# Patient Record
Sex: MALE | Race: WHITE | Employment: OTHER | URBAN - METROPOLITAN AREA
[De-identification: names, ages, dates, MRNs, and addresses within clinical notes are randomized per-mention and may not be internally consistent; named-entity substitution may affect disease eponyms.]

---

## 2023-01-31 ENCOUNTER — HOSPITAL ENCOUNTER (EMERGENCY)
Facility: CLINIC | Age: 88
Discharge: HOME | End: 2023-01-31
Attending: EMERGENCY MEDICINE
Payer: MEDICARE

## 2023-01-31 VITALS
WEIGHT: 187 LBS | SYSTOLIC BLOOD PRESSURE: 154 MMHG | RESPIRATION RATE: 18 BRPM | HEART RATE: 78 BPM | HEIGHT: 67 IN | OXYGEN SATURATION: 93 % | DIASTOLIC BLOOD PRESSURE: 81 MMHG | TEMPERATURE: 97.5 F | BODY MASS INDEX: 29.35 KG/M2

## 2023-01-31 DIAGNOSIS — I10 HYPERTENSION, UNSPECIFIED TYPE: Primary | ICD-10-CM

## 2023-01-31 LAB
ABSOLUTE EOS #: 0.3 K/UL (ref 0–0.4)
ABSOLUTE LYMPH #: 2.2 K/UL (ref 1–4.8)
ABSOLUTE MONO #: 0.5 K/UL (ref 0.1–1.2)
ANION GAP SERPL CALCULATED.3IONS-SCNC: 12 MMOL/L (ref 9–17)
BACTERIA: NORMAL
BASOPHILS # BLD: 1 % (ref 0–2)
BASOPHILS ABSOLUTE: 0.1 K/UL (ref 0–0.2)
BILIRUBIN URINE: NEGATIVE
BUN BLDV-MCNC: 17 MG/DL (ref 8–23)
CALCIUM SERPL-MCNC: 9.6 MG/DL (ref 8.6–10.4)
CHLORIDE BLD-SCNC: 102 MMOL/L (ref 98–107)
CO2: 24 MMOL/L (ref 20–31)
COLOR: YELLOW
CREAT SERPL-MCNC: 0.7 MG/DL (ref 0.7–1.2)
EOSINOPHILS RELATIVE PERCENT: 3 % (ref 1–4)
EPITHELIAL CELLS UA: NORMAL /HPF (ref 0–5)
GFR SERPL CREATININE-BSD FRML MDRD: >60 ML/MIN/1.73M2
GLUCOSE BLD-MCNC: 117 MG/DL (ref 70–99)
GLUCOSE URINE: NEGATIVE
HCT VFR BLD CALC: 44.9 % (ref 41–53)
HEMOGLOBIN: 15.2 G/DL (ref 13.5–17.5)
KETONES, URINE: NEGATIVE
LEUKOCYTE ESTERASE, URINE: NEGATIVE
LYMPHOCYTES # BLD: 26 % (ref 24–44)
MCH RBC QN AUTO: 29.1 PG (ref 26–34)
MCHC RBC AUTO-ENTMCNC: 33.9 G/DL (ref 31–37)
MCV RBC AUTO: 85.8 FL (ref 80–100)
MONOCYTES # BLD: 6 % (ref 2–11)
NITRITE, URINE: NEGATIVE
PDW BLD-RTO: 14.6 % (ref 12.5–15.4)
PH UA: 6.5 (ref 5–8)
PLATELET # BLD: 178 K/UL (ref 140–450)
PMV BLD AUTO: 7.4 FL (ref 6–12)
POTASSIUM SERPL-SCNC: 3.8 MMOL/L (ref 3.7–5.3)
PROTEIN UA: NEGATIVE
RBC # BLD: 5.24 M/UL (ref 4.5–5.9)
RBC UA: NORMAL /HPF (ref 0–2)
SEG NEUTROPHILS: 64 % (ref 36–66)
SEGMENTED NEUTROPHILS ABSOLUTE COUNT: 5.6 K/UL (ref 1.8–7.7)
SODIUM BLD-SCNC: 138 MMOL/L (ref 135–144)
SPECIFIC GRAVITY UA: 1 (ref 1–1.03)
TROPONIN, HIGH SENSITIVITY: 12 NG/L (ref 0–22)
TURBIDITY: CLEAR
URINE HGB: NEGATIVE
UROBILINOGEN, URINE: NORMAL
WBC # BLD: 8.6 K/UL (ref 3.5–11)
WBC UA: NORMAL /HPF (ref 0–5)

## 2023-01-31 PROCEDURE — 93005 ELECTROCARDIOGRAM TRACING: CPT | Performed by: REGISTERED NURSE

## 2023-01-31 PROCEDURE — 81001 URINALYSIS AUTO W/SCOPE: CPT

## 2023-01-31 PROCEDURE — 6370000000 HC RX 637 (ALT 250 FOR IP): Performed by: REGISTERED NURSE

## 2023-01-31 PROCEDURE — 85025 COMPLETE CBC W/AUTO DIFF WBC: CPT

## 2023-01-31 PROCEDURE — 80048 BASIC METABOLIC PNL TOTAL CA: CPT

## 2023-01-31 PROCEDURE — 99284 EMERGENCY DEPT VISIT MOD MDM: CPT

## 2023-01-31 PROCEDURE — 36415 COLL VENOUS BLD VENIPUNCTURE: CPT

## 2023-01-31 PROCEDURE — 84484 ASSAY OF TROPONIN QUANT: CPT

## 2023-01-31 RX ORDER — ATORVASTATIN CALCIUM 40 MG/1
40 TABLET, FILM COATED ORAL DAILY
COMMUNITY

## 2023-01-31 RX ORDER — LEVOTHYROXINE SODIUM 0.12 MG/1
125 TABLET ORAL DAILY
COMMUNITY

## 2023-01-31 RX ORDER — LISINOPRIL 10 MG/1
20 TABLET ORAL DAILY
COMMUNITY
Start: 2010-03-22

## 2023-01-31 RX ORDER — CLONIDINE HYDROCHLORIDE 0.1 MG/1
0.1 TABLET ORAL ONCE
Status: COMPLETED | OUTPATIENT
Start: 2023-01-31 | End: 2023-01-31

## 2023-01-31 RX ORDER — TRAZODONE HYDROCHLORIDE 50 MG/1
100 TABLET ORAL NIGHTLY
COMMUNITY

## 2023-01-31 RX ORDER — GLIMEPIRIDE 2 MG/1
2 TABLET ORAL
COMMUNITY

## 2023-01-31 RX ADMIN — CLONIDINE HYDROCHLORIDE 0.1 MG: 0.1 TABLET ORAL at 11:39

## 2023-01-31 ASSESSMENT — LIFESTYLE VARIABLES
HOW OFTEN DO YOU HAVE A DRINK CONTAINING ALCOHOL: MONTHLY OR LESS
HOW MANY STANDARD DRINKS CONTAINING ALCOHOL DO YOU HAVE ON A TYPICAL DAY: 1 OR 2

## 2023-01-31 ASSESSMENT — ENCOUNTER SYMPTOMS
SHORTNESS OF BREATH: 0
PHOTOPHOBIA: 0
NAUSEA: 0
VOMITING: 0
ABDOMINAL PAIN: 0
COUGH: 0
DIARRHEA: 0
BACK PAIN: 0

## 2023-01-31 ASSESSMENT — PAIN DESCRIPTION - ORIENTATION: ORIENTATION: LEFT

## 2023-01-31 ASSESSMENT — PAIN - FUNCTIONAL ASSESSMENT: PAIN_FUNCTIONAL_ASSESSMENT: NONE - DENIES PAIN

## 2023-01-31 ASSESSMENT — PAIN DESCRIPTION - LOCATION: LOCATION: BUTTOCKS

## 2023-01-31 ASSESSMENT — PAIN SCALES - GENERAL: PAINLEVEL_OUTOF10: 0

## 2023-01-31 ASSESSMENT — PAIN DESCRIPTION - PAIN TYPE: TYPE: CHRONIC PAIN

## 2023-01-31 NOTE — DISCHARGE INSTRUCTIONS
PLEASE RETURN TO THE EMERGENCY DEPARTMENT IMMEDIATELY if your symptoms worsen in anyway or in 8-12 hours if not improved for re-evaluation. You should immediately return to the ER for symptoms such as increasing pain, shortness of breath, fever, a feeling of passing out, light headed, dizziness, abdominal pain, numbness or weakness to the arms or legs, coolness or color change of the arms or legs. Take your medication as indicated and prescribed. If you are given an antibiotic then, make sure you get the prescription filled and take the antibiotics until finished. Please understand that at this time there is no evidence for a more serious underlying process, but that early in the process of an illness or injury, an emergency department workup can be falsely reassuring. You should contact your family doctor within the next 24 hours for a follow up appointment    Joaquin Ballard!!!    From [facility] [place] ([facility]) and [facility] Emergency Services    On behalf of the Emergency Department staff at [facility]), I would like to thank you for giving us the opportunity to address your health care needs and concerns. We hope that during your visit, our service was delivered in a professional and caring manner. Please keep [facility] [place] ([facility]) in mind as we walk with you down the path to your own personal wellness. Please expect an automated text message or email from us so we can ask a few questions about your health and progress. Based on your answers, a clinician may call you back to offer help and instructions. Please understand that early in the process of an illness or injury, an emergency department workup can be falsely reassuring. If you notice any worsening, changing or persistent symptoms please call your family doctor or return to the ER immediately. Tell us how we did during your visit at [email or web address]   and let us know about your experience

## 2023-01-31 NOTE — ED PROVIDER NOTES
[facility]  [facility]  Phone: [phone]        Pt Name: Robert Salmon  MRN: [id number]  [place] 6/1/1934  Date of evaluation: 1/31/23    CHIEFCOMPLAINT       Chief Complaint   Patient presents with    Hypertension       HISTORY OF PRESENT ILLNESS (Location/Symptom, Timing/Onset, Context/Setting, Quality, Duration, Modifying Factors, Severity)      Robert Salmon is a 80 y.o. male  who presents to the ED via private auto with elevated blood pressure. Patient states that he went for an MRI this morning and was informed by staff that his blood pressure is elevated was not told the number. He states that nurse follow-up on called him at home to check up on his care and asked patient to take his blood pressure where he states that it was 170s over 110s, he states that the nurse hotline recommended he come to the ER to be evaluated. Patient denies any headache, vision changes, chest pain, shortness of breath, difficulty breathing, abdominal pain nausea vomiting or diarrhea. Patient does report a short few episodes of feeling \"lightheaded\" he states he attributes this to getting less sleep than normal last night. Patient she does take lisinopril for his blood pressure and states there is been no changes recently to his occasions. On arrival he is resting on the cot comfortably with even nonlabored breaths is nontoxic-appearing with no acute distress noted. PAST MEDICAL / SURGICAL / SOCIAL / FAMILY HISTORY     PMH:  has a past medical history of Bladder cancer ([facility] [address] Diabetes mellitus ([facility] [address] Hyperlipidemia, Hypertension, Kidney stone, and Thyroid disease. Surgical History:  has a past surgical history that includes Tonsillectomy. Social History:  reports that he has quit smoking. His smoking use included cigarettes. He has never used smokeless tobacco. He reports current alcohol use. Family History: has no family status information on file.     family history is not on file.  Psychiatric History: None    Allergies: Penicillin g    Home Medications:   Prior to Admission medications    Medication Sig Start Date End Date Taking? Authorizing Provider   lisinopril (PRINIVIL;ZESTRIL) 10 MG tablet Take 20 mg by mouth daily 3/22/10  Yes Historical Provider, MD   levothyroxine (SYNTHROID) 125 MCG tablet Take 125 mcg by mouth Daily   Yes Historical Provider, MD   atorvastatin (LIPITOR) 40 MG tablet Take 40 mg by mouth daily   Yes Historical Provider, MD   glimepiride (AMARYL) 2 MG tablet Take 2 mg by mouth every morning (before breakfast)   Yes Historical Provider, MD   traZODone (DESYREL) 50 MG tablet Take 100 mg by mouth nightly   Yes Historical Provider, MD       REVIEW OF SYSTEMS  (2-9 systems for level 4, 10 ormore for level 5)      Review of Systems   Constitutional:  Negative for chills and fever. Eyes:  Negative for photophobia and visual disturbance. Respiratory:  Negative for cough and shortness of breath. Cardiovascular:  Negative for chest pain and palpitations. Gastrointestinal:  Negative for abdominal pain, diarrhea, nausea and vomiting. Genitourinary:  Negative for dysuria and hematuria. Musculoskeletal:  Negative for back pain, neck pain and neck stiffness. Neurological:  Positive for light-headedness. Negative for dizziness, syncope, facial asymmetry, speech difficulty, weakness, numbness and headaches. All other systems negative except as marked. PHYSICAL EXAM  (up to 7 for level 4, 8 or more for level 5)      INITIAL VITALS:  height is 5' 7\" (1.702 m) and weight is 84.8 kg (187 lb). His oral temperature is 97.5 °F (36.4 °C). His blood pressure is 154/81 (abnormal) and his pulse is 78. His respiration is 18 and oxygen saturation is 93%. Vital signs reviewed. Physical Exam  Constitutional:       General: He is not in acute distress. Appearance: Normal appearance. He is not ill-appearing or toxic-appearing.    HENT:      Head: Normocephalic and atraumatic.   Eyes:      General: No scleral icterus.        Right eye: No discharge.         Left eye: No discharge.      Extraocular Movements: Extraocular movements intact.      Conjunctiva/sclera: Conjunctivae normal.      Pupils: Pupils are equal, round, and reactive to light.   Neck:      Trachea: No tracheal deviation.   Cardiovascular:      Rate and Rhythm: Normal rate and regular rhythm.      Pulses:           Radial pulses are 2+ on the right side and 2+ on the left side.      Heart sounds: Normal heart sounds.   Pulmonary:      Effort: Pulmonary effort is normal.      Breath sounds: Normal breath sounds.   Abdominal:      General: Abdomen is flat. There is no distension.      Palpations: Abdomen is soft. There is no mass.      Tenderness: There is no abdominal tenderness. There is no guarding or rebound.      Hernia: No hernia is present.   Musculoskeletal:      Cervical back: Neck supple.      Right lower leg: No edema.      Left lower leg: No edema.   Skin:     General: Skin is warm and dry.      Capillary Refill: Capillary refill takes less than 2 seconds.   Neurological:      General: No focal deficit present.      Mental Status: He is alert and oriented to person, place, and time.      Cranial Nerves: No cranial nerve deficit.      Sensory: No sensory deficit.      Motor: No weakness.   Psychiatric:         Mood and Affect: Mood normal.         Behavior: Behavior normal.         DIFFERENTIAL DIAGNOSIS / MDM     Differential diagnosis considered: ACS, hypertensive urgency/crisis, CVA, MARY, CKD, anxiety    Chronic Conditions affecting care (DM,HTN,CA, etc): Hypertension, diabetes    Social Determinants of Health affecting care (unable to care for self, lives alone, unemployed, homeless,etc): None    History source(s) (patient,spouse,parent,family,friend,EMS,etc): Patient    Review of external sources (ECF,Hospital records,EMS report, radiology reports, etc): Past medical  records    Tests considered but not ordered: Not applicable    Independent interpretation of tests (eg.  X-ray, CAT scan, Doppler studies, EKG): EKG    Discussion of x-ray results with radiology: No applicable    Consults: See below    Consideration for admission/observation (even if discharged): Patient was considered for admission pending work-up. Prescription considerations: Clonidine    Sepsis considered: Yes    Critical Care note written: See below    After my physical exam I do have concern for possible ACS, hypertensive urgency/crisis, CVA, MARY, CKD and anxiety. Obtain baseline labs, EKG and troponin. CBC and BMP unremarkable. Troponin is negative. UA is unremarkable. Patient's blood pressure is improved with clonidine. Patient states he still is asymptomatic. Patient states he does have a follow-up with his PCP on Friday. I plan discharge patient home to follow-up with PCP on Friday as discussed. Instructed patient return with any headache, dizziness, vision changes, numbness/tingling, chest pain, shortness of breath, difficulty breathing, dumping, nausea vomiting or diarrhea. Patient was agreement to this plan this time. All question concerns were answered at this time. At this time the patient is without objective evidence of an acute process requiring hospitalization or inpatient management. They have remained hemodynamically stable throughout their entire ED visit and are stable for discharge with outpatient follow-up. The patient understands that at this time there is no evidence for a more malignant underlying process, but the patient also understands that early in the process of an illness or injury, an emergency department workup can be falsely reassuring.   Routine discharge counseling was given, and the patient understands that worsening, changing or persistent symptoms should prompt an immediate call or follow up with their primary physician or return to the emergency department. The importance of appropriate follow up was also discussed. I have reviewed the disposition diagnosis with the patient and or their family/guardian. I have answered their questions and given discharge instructions. They voiced understanding of these instructions and did not have any further questions or complaints. PLAN (LABS / IMAGING / EKG):  Orders Placed This Encounter   Procedures    Basic Metabolic Panel    CBC with Auto Differential    Troponin    Urinalysis with Microscopic    EKG 12 Lead       MEDICATIONS ORDERED:  Orders Placed This Encounter   Medications    cloNIDine (CATAPRES) tablet 0.1 mg       Controlled Substances Monitoring:     DIAGNOSTIC RESULTS     EKG: All EKG's are interpreted by the Emergency Department Physician who either signs or Co-signs this chart in the absenceof a cardiologist.        RADIOLOGY: All images are read by the radiologist and their interpretations are reviewed. No orders to display       No results found.     LABS:  Results for orders placed or performed during the hospital encounter of 05/97/73   Basic Metabolic Panel   Result Value Ref Range    Glucose 117 (H) 70 - 99 mg/dL    BUN 17 8 - 23 mg/dL    Creatinine 0.70 0.70 - 1.20 mg/dL    Est, Glom Filt Rate >60 >60 mL/min/1.73m2    Calcium 9.6 8.6 - 10.4 mg/dL    Sodium 138 135 - 144 mmol/L    Potassium 3.8 3.7 - 5.3 mmol/L    Chloride 102 98 - 107 mmol/L    CO2 24 20 - 31 mmol/L    Anion Gap 12 9 - 17 mmol/L   CBC with Auto Differential   Result Value Ref Range    WBC 8.6 3.5 - 11.0 k/uL    RBC 5.24 4.5 - 5.9 m/uL    Hemoglobin 15.2 13.5 - 17.5 g/dL    Hematocrit 44.9 41 - 53 %    MCV 85.8 80 - 100 fL    MCH 29.1 26 - 34 pg    MCHC 33.9 31 - 37 g/dL    RDW 14.6 12.5 - 15.4 %    Platelets 987 197 - 370 k/uL    MPV 7.4 6.0 - 12.0 fL    Seg Neutrophils 64 36 - 66 %    Lymphocytes 26 24 - 44 %    Monocytes 6 2 - 11 %    Eosinophils % 3 1 - 4 %    Basophils 1 0 - 2 %    Segs Absolute 5.60 1.8 - 7.7 k/uL Absolute Lymph # 2.20 1.0 - 4.8 k/uL    Absolute Mono # 0.50 0.1 - 1.2 k/uL    Absolute Eos # 0.30 0.0 - 0.4 k/uL    Basophils Absolute 0.10 0.0 - 0.2 k/uL   Troponin   Result Value Ref Range    Troponin, High Sensitivity 12 0 - 22 ng/L   Urinalysis with Microscopic   Result Value Ref Range    Color, UA Yellow Yellow    Turbidity UA Clear Clear    Glucose, Ur NEGATIVE NEGATIVE    Bilirubin Urine NEGATIVE NEGATIVE    Ketones, Urine NEGATIVE NEGATIVE    Specific [place], UA 1.005 1.005 - 1.030    Urine Hgb NEGATIVE NEGATIVE    pH, UA 6.5 5.0 - 8.0    Protein, UA NEGATIVE NEGATIVE    Urobilinogen, Urine Normal Normal    Nitrite, Urine NEGATIVE NEGATIVE    Leukocyte Esterase, Urine NEGATIVE NEGATIVE    WBC, UA None 0 - 5 /HPF    RBC, UA None 0 - 2 /HPF    Epithelial Cells UA 2 TO 5 0 - 5 /HPF    Bacteria, UA None None       EMERGENCY DEPARTMENT COURSE           Vitals:    Vitals:    01/31/23 1055 01/31/23 1139 01/31/23 1211   BP: (!) 210/98 (!) 192/89 (!) 154/81   Pulse: 88  78   Resp: 18     Temp: 97.5 °F (36.4 °C)     TempSrc: Oral     SpO2: 96% 96% 93%   Weight: 84.8 kg (187 lb)     Height: 5' 7\" (1.702 m)       -------------------------  BP: (!) 154/81, Temp: 97.5 °F (36.4 °C), Heart Rate: 78, Resp: 18      RE-EVALUATION:  See ED Course notes above. CONSULTS:  None    PROCEDURES:  None    FINAL IMPRESSION      1. Hypertension, unspecified type          DISPOSITION / PLAN     CONDITION ON DISPOSITION:   Stable for discharge.      PATIENT REFERRED TO:  Jesus Solorio MD  [address]  [phone]    Call in 1 day      [facility]  C/ [facility]  [phone]    If symptoms worsen    DISCHARGE MEDICATIONS:  Discharge Medication List as of 1/31/2023 12:45 PM          ISA Nguyen - CNP   Emergency Medicine Nurse Practitioner    (Please note that portions of this note were completed with a voice recognition program.  Efforts were made to edit the dictations but occasionally words aremis-transcribed.)       Veronica Flaherty, ISA - CNP  01/31/23 1257

## 2023-01-31 NOTE — ED PROVIDER NOTES
Emergency Department           COMPLAINT       Chief Complaint   Patient presents with    Hypertension      PHYSICAL EXAM      ED Triage Vitals [01/31/23 1055]   BP Temp Temp Source Heart Rate Resp SpO2 Height Weight   (!) 210/98 97.5 °F (36.4 °C) Oral 88 18 96 % 5' 7\" (1.702 m) 187 lb (84.8 kg)       Constitutional: Alert, oriented x3, nontoxic, answering questions appropriately, acting properly for age, in no acute distress   HEENT: Extraocular muscles intact, no photophobia  Neck: Trachea midline no meningismus  Cardiovascular: Regular rhythm and rate no murmurs   Respiratory: Clear to auscultation bilaterally no wheezes, rhonchi, rales, no respiratory distress no tachypnea no retractions no hypoxia  Gastrointestinal: Soft, nontender, nondistended, positive bowel sounds. No rebound, rigidity, or guarding.    Musculoskeletal: No extremity pain or swelling no calf tenderness or asymmetry  Neurologic: Moving all 4 extremities without difficulty there are no gross focal neurologic deficits   Skin: Warm and dry         Physical Exam  DIAGNOSTIC RESULTS     EKG: All EKG's are interpreted by the Emergency Department Physician who either signs or Co-signs this chart in the absence of a cardiologist.    1118 sinus rhythm first-degree AV block rate 67 MD undetectable   no ST elevation    Not indicated unless otherwise documented above or in the midlevel documentation    LABS:  Results for orders placed or performed during the hospital encounter of 94/52/76   Basic Metabolic Panel   Result Value Ref Range    Glucose 117 (H) 70 - 99 mg/dL    BUN 17 8 - 23 mg/dL    Creatinine 0.70 0.70 - 1.20 mg/dL    Est, Glom Filt Rate >60 >60 mL/min/1.73m2    Calcium 9.6 8.6 - 10.4 mg/dL    Sodium 138 135 - 144 mmol/L    Potassium 3.8 3.7 - 5.3 mmol/L    Chloride 102 98 - 107 mmol/L    CO2 24 20 - 31 mmol/L    Anion Gap 12 9 - 17 mmol/L   CBC with Auto Differential   Result Value Ref Range    WBC 8.6 3.5 - 11.0 k/uL RBC 5.24 4.5 - 5.9 m/uL    Hemoglobin 15.2 13.5 - 17.5 g/dL    Hematocrit 44.9 41 - 53 %    MCV 85.8 80 - 100 fL    MCH 29.1 26 - 34 pg    MCHC 33.9 31 - 37 g/dL    RDW 14.6 12.5 - 15.4 %    Platelets 084 482 - 045 k/uL    MPV 7.4 6.0 - 12.0 fL    Seg Neutrophils 64 36 - 66 %    Lymphocytes 26 24 - 44 %    Monocytes 6 2 - 11 %    Eosinophils % 3 1 - 4 %    Basophils 1 0 - 2 %    Segs Absolute 5.60 1.8 - 7.7 k/uL    Absolute Lymph # 2.20 1.0 - 4.8 k/uL    Absolute Mono # 0.50 0.1 - 1.2 k/uL    Absolute Eos # 0.30 0.0 - 0.4 k/uL    Basophils Absolute 0.10 0.0 - 0.2 k/uL   Troponin   Result Value Ref Range    Troponin, High Sensitivity 12 0 - 22 ng/L   Urinalysis with Microscopic   Result Value Ref Range    Color, UA Yellow Yellow    Turbidity UA Clear Clear    Glucose, Ur NEGATIVE NEGATIVE    Bilirubin Urine NEGATIVE NEGATIVE    Ketones, Urine NEGATIVE NEGATIVE    Specific [place], UA 1.005 1.005 - 1.030    Urine Hgb NEGATIVE NEGATIVE    pH, UA 6.5 5.0 - 8.0    Protein, UA NEGATIVE NEGATIVE    Urobilinogen, Urine Normal Normal    Nitrite, Urine NEGATIVE NEGATIVE    Leukocyte Esterase, Urine NEGATIVE NEGATIVE    WBC, UA None 0 - 5 /HPF    RBC, UA None 0 - 2 /HPF    Epithelial Cells UA 2 TO 5 0 - 5 /HPF    Bacteria, UA None None       Not indicated unless otherwise documented above or in the midlevel documentation    RADIOLOGY:   I reviewedthe radiologist interpretations:  No orders to display       Not indicated unless otherwise documented above or in the midlevel documentation    EMERGENCY DEPARTMENT COURSE:     The patient was given the following medications:  Orders Placed This Encounter   Medications    cloNIDine (CATAPRES) tablet 0.1 mg        PERTINENT ATTENDING PHYSICIAN COMMENTS:    Essentially asymptomatic hypertension. On Friday seen by his doctor for sciatica blood pressure was elevated at that time. Today received a phone call checking up on him and his blood pressure was still elevated.   Last headache blurry vision or double vision. Denies chest pain or shortness of breath. Denies any other complaints except very faint nausea on occasion but none currently. IV labs. Catapres. EKG. 12:50 PM blood pressure markedly improved after clonidine. Normal CBC normal electrolytes and kidney function. Urinalysis no proteinuria. EKG no ST elevations. We will follow-up with his family physician for which he has an appointment later this week. Faculty Attestation    I performed a history and physical examination of the patient and discussed management with the mid level provideer. I reviewed the mid level provider's note and agree with the documented findings and plan of care. Any areas of disagreement are noted on the chart. I was personally present for the key portions of any procedures. I have documented in the chart those procedures where I was not present during the key portions. I have reviewed the emergency nurses triage note. I agree with the chief complaint, past medical history, past surgical history, allergies, medications, social and family history as documented unless otherwise noted below. Documentation of the HPI, Physical Exam and Medical Decision Making performed by medical students or scribes is based on my personal performance of the HPI, PE and MDM. For Physician Assistant/ Nurse Practitioner cases/documentation I have personally evaluated this patient and have completed at least one if not all key elements of the E/M (history, physical exam, and MDM). Additional findings are as noted.       Ruth Droan, DO  01/31/23 [address], DO  01/31/23 8865

## 2023-01-31 NOTE — ED NOTES
Mode of arrival (squad #, walk in, police, etc) : walk in, from home, with daughter       Arrival Note (brief scenario, treatment PTA, etc). : patient reports that he was at his PCP office this past Friday, his BP was high during that visit but they believe it was related to the sciatica pain he was having. He reports a wellness nurse calls once a month and called today, she inquired about his BP and advised patient to come to ED since it was reading high. Patient reports that he took his BP several times while on the phone with the wellness nurse and readings were in the 170's/ 90's. He denies feeling any different from his usual days and denies any headache, chest pain, or shortness of breath.            Ashley Nur RN  01/31/23 8422

## 2023-02-01 LAB
EKG ATRIAL RATE: 80 BPM
EKG Q-T INTERVAL: 404 MS
EKG QRS DURATION: 114 MS
EKG QTC CALCULATION (BAZETT): 426 MS
EKG R AXIS: -25 DEGREES
EKG T AXIS: -3 DEGREES
EKG VENTRICULAR RATE: 67 BPM

## 2023-03-27 ENCOUNTER — HOSPITAL ENCOUNTER (OUTPATIENT)
Dept: VASCULAR LAB | Age: 88
Discharge: HOME | End: 2023-03-27
Payer: MEDICARE

## 2023-03-27 DIAGNOSIS — I73.9 CLAUDICATION (HCC): ICD-10-CM

## 2023-03-27 DIAGNOSIS — M79.605 LEFT LEG PAIN: ICD-10-CM

## 2023-03-27 PROCEDURE — 93926 LOWER EXTREMITY STUDY: CPT

## 2023-03-28 RX ORDER — TADALAFIL 5 MG/1
5 TABLET ORAL PRN
COMMUNITY

## 2023-03-28 RX ORDER — AMLODIPINE BESYLATE 5 MG/1
5 TABLET ORAL DAILY
COMMUNITY

## 2023-03-29 ENCOUNTER — HOSPITAL ENCOUNTER (OUTPATIENT)
Dept: CARDIAC CATH/INVASIVE PROCEDURES | Age: 88
Setting detail: OBSERVATION
Discharge: HOME | End: 2023-03-29
Attending: INTERNAL MEDICINE | Admitting: INTERNAL MEDICINE
Payer: MEDICARE

## 2023-03-29 VITALS
TEMPERATURE: 97.9 F | SYSTOLIC BLOOD PRESSURE: 132 MMHG | DIASTOLIC BLOOD PRESSURE: 58 MMHG | OXYGEN SATURATION: 95 % | RESPIRATION RATE: 14 BRPM | WEIGHT: 187 LBS | HEART RATE: 87 BPM | BODY MASS INDEX: 29.35 KG/M2 | HEIGHT: 67 IN

## 2023-03-29 LAB — GLUCOSE BLD-MCNC: 156 MG/DL (ref 75–110)

## 2023-03-29 PROCEDURE — 93005 ELECTROCARDIOGRAM TRACING: CPT

## 2023-03-29 PROCEDURE — C1769 GUIDE WIRE: HCPCS

## 2023-03-29 PROCEDURE — 2580000003 HC RX 258: Performed by: INTERNAL MEDICINE

## 2023-03-29 PROCEDURE — 2709999900 HC NON-CHARGEABLE SUPPLY

## 2023-03-29 PROCEDURE — C1751 CATH, INF, PER/CENT/MIDLINE: HCPCS

## 2023-03-29 PROCEDURE — 82947 ASSAY GLUCOSE BLOOD QUANT: CPT

## 2023-03-29 PROCEDURE — 2500000003 HC RX 250 WO HCPCS

## 2023-03-29 PROCEDURE — G0378 HOSPITAL OBSERVATION PER HR: HCPCS

## 2023-03-29 PROCEDURE — C1760 CLOSURE DEV, VASC: HCPCS

## 2023-03-29 PROCEDURE — 6360000002 HC RX W HCPCS

## 2023-03-29 PROCEDURE — C1894 INTRO/SHEATH, NON-LASER: HCPCS

## 2023-03-29 PROCEDURE — 6360000004 HC RX CONTRAST MEDICATION

## 2023-03-29 PROCEDURE — 93456 R HRT CORONARY ARTERY ANGIO: CPT

## 2023-03-29 RX ORDER — ONDANSETRON 2 MG/ML
4 INJECTION INTRAMUSCULAR; INTRAVENOUS EVERY 6 HOURS PRN
Status: DISCONTINUED | OUTPATIENT
Start: 2023-03-29 | End: 2023-03-29 | Stop reason: HOSPADM

## 2023-03-29 RX ORDER — SODIUM CHLORIDE 9 MG/ML
INJECTION, SOLUTION INTRAVENOUS CONTINUOUS
Status: DISCONTINUED | OUTPATIENT
Start: 2023-03-29 | End: 2023-03-29 | Stop reason: HOSPADM

## 2023-03-29 RX ORDER — SODIUM CHLORIDE 0.9 % (FLUSH) 0.9 %
5-40 SYRINGE (ML) INJECTION EVERY 12 HOURS SCHEDULED
Status: DISCONTINUED | OUTPATIENT
Start: 2023-03-29 | End: 2023-03-29 | Stop reason: HOSPADM

## 2023-03-29 RX ORDER — SODIUM CHLORIDE 9 MG/ML
INJECTION, SOLUTION INTRAVENOUS PRN
Status: DISCONTINUED | OUTPATIENT
Start: 2023-03-29 | End: 2023-03-29 | Stop reason: HOSPADM

## 2023-03-29 RX ORDER — FENTANYL CITRATE 0.05 MG/ML
25 INJECTION, SOLUTION INTRAMUSCULAR; INTRAVENOUS
Status: DISCONTINUED | OUTPATIENT
Start: 2023-03-29 | End: 2023-03-29 | Stop reason: HOSPADM

## 2023-03-29 RX ORDER — ACETAMINOPHEN 325 MG/1
650 TABLET ORAL EVERY 4 HOURS PRN
Status: DISCONTINUED | OUTPATIENT
Start: 2023-03-29 | End: 2023-03-29 | Stop reason: HOSPADM

## 2023-03-29 RX ORDER — SODIUM CHLORIDE 0.9 % (FLUSH) 0.9 %
5-40 SYRINGE (ML) INJECTION PRN
Status: DISCONTINUED | OUTPATIENT
Start: 2023-03-29 | End: 2023-03-29 | Stop reason: HOSPADM

## 2023-03-29 RX ADMIN — SODIUM CHLORIDE: 9 INJECTION, SOLUTION INTRAVENOUS at 12:54

## 2023-03-29 ASSESSMENT — PAIN - FUNCTIONAL ASSESSMENT: PAIN_FUNCTIONAL_ASSESSMENT: 0-10

## 2023-03-29 NOTE — PROGRESS NOTES
Dr. Haque Carry out to unit. States patient is OK for discharge home. Patient requesting to followup at [facility]. No STEMI or NSTEMI issues or S/S at this time.  Dr. Haque Carry states that he spoke with patient and his family and he can be discharged at 6pm.

## 2023-03-29 NOTE — PROGRESS NOTES
CT surgery paged at this time to notify of consult for MVD. Answering service aware and paged out for call back at this time. Clarified with Dr. Marr Sole - this is not a CT surg consult. Yany Vale NP notified that cath lab got confused and this patient is not a CT surgery consult. Cath lab called back to notify at this time that plan was clarified.

## 2023-03-29 NOTE — PROGRESS NOTES
Patient admitted to room 2035 from cath lab post cardiac cath with Dr. Camille Morales. Patient is alert and oriented. Vitals as charted. Patient oriented to room and call light. Patient instructed to keep head and R leg flat - patient verbalizes understanding. Groin sites (R vein and R artery) are soft, no evidence of bleeding, bruising or hematoma. Pedal pulses palpable. Will continue to assess and monitor.

## 2023-03-29 NOTE — H&P
History and Physical Service   [facility]    HISTORY AND PHYSICAL EXAMINATION            Date of Evaluation: 3/29/2023  Patient name:  Soco Johnson  MRN:   [id number]  YOB: 1934  PCP:    Mike Garibay MD    History Obtained From:     Patient, Medical records    History of Present Illness: This is Soco Johnson a 80 y.o. male who presents today for a cardiac catheterization with possible PCI by Dr. Cheng Harrell due to abnormal stress test. Patient states he had an abnormal stress test in 2/2023. History of AV block, CAD, hyperlipidemia, hypertension, MI in 1994, nonrheumatic aortic valve stenosis, and bilateral carotid stenosis. S/p coronary angioplasty in 1994. Pt states he has palpitations when he is \"overactive\". Pt denies fevers, chills, chest pain, dyspnea, rashes, open sores, and wounds. History of diabetes. POC blood glucose today is 156 mg/dL. Pt denies taking blood thinning medications in the past 10 days.     EKG 3/29/23  Narrative & Impression    Sinus rhythm with 1st degree A-V block with occasional Premature ventricular complexes  Inferior infarct , age undetermined  Abnormal ECG  When compared with ECG of 31-JAN-2023 11:18,  Premature ventricular complexes are now Present  Sinus rhythm is no longer with 2nd degree A-V block ([place] I)      Specimen Collected: 03/29/23 12:55 EDT Last Resulted: 03/29/23 13:04 EDT               Past Medical History:     Past Medical History:   Diagnosis Date    Acquired hypothyroidism     AV block     Bladder cancer ([address]     BPH (benign prostatic hyperplasia)     CAD (coronary artery disease)     Carotid stenosis, bilateral     Chronic fatigue     Diabetes mellitus (HCC)     type 2    ED (erectile dysfunction)     [place] (hard of hearing)     Hyperlipidemia     Hypertension     Insomnia     Kidney stone     MI (myocardial infarction) ([address]     Nonrheumatic aortic (valve) stenosis     Sciatica of left side     Thyroid disease     VMR

## 2023-03-30 LAB
EKG ATRIAL RATE: 96 BPM
EKG P-R INTERVAL: 288 MS
EKG Q-T INTERVAL: 358 MS
EKG QRS DURATION: 120 MS
EKG QTC CALCULATION (BAZETT): 452 MS
EKG R AXIS: -20 DEGREES
EKG T AXIS: 16 DEGREES
EKG VENTRICULAR RATE: 96 BPM

## 2023-07-12 ENCOUNTER — OFFICE VISIT (OUTPATIENT)
Dept: ORTHOPEDIC SURGERY | Age: 88
End: 2023-07-12

## 2023-07-12 VITALS — BODY MASS INDEX: 29.35 KG/M2 | HEIGHT: 67 IN | WEIGHT: 187 LBS

## 2023-07-12 DIAGNOSIS — R52 PAIN: Primary | ICD-10-CM

## 2023-07-12 DIAGNOSIS — M54.32 SCIATICA OF LEFT SIDE: ICD-10-CM

## 2023-07-12 DIAGNOSIS — S76.312A STRAIN OF LEFT PIRIFORMIS MUSCLE, INITIAL ENCOUNTER: ICD-10-CM

## 2023-07-12 NOTE — PROGRESS NOTES
[facility]  [address] [address]  Dept Phone: [phone]  Dept Fax: [phone]      Orthopaedic Trauma New Patient      CHIEF COMPLAINT:    Chief Complaint   Patient presents with    New Patient     Left hip         HISTORY OF PRESENT ILLNESS:    The patient is a 80 y.o. male who is being seen as a new patient for left hip/buttock pain. Patient is accompanied clinic today with his daughter. Patient states he has had ongoing left hip/buttock pain for the past 4-5 months. Patient states it started when he was replacing a vanity in his home and then felt it later that day. Patient states that up until March he had a progressive increase in pain, but over the past 4 to 5 days his pain has become substantially worse. Patient states his pain shoots down the back of his leg. He denies any numbness or tingling or weakness. Patient states only certain positions exacerbate the pain. Patient's pain becomes so bad that he audibly cries out in pain. Patient unable to sleep in a bed or lie flat secondary to increase in pain. Patient has been sleeping in a recliner. Patient does have a history of diabetes, but his last hemoglobin A1c was 6.8 taken approximately 3 months ago. Patient is retired, but was a prior . Patient has had low back issues since he was in the [facility] when he was younger, with no known specific injury. But patient does have chronic low back pain. Patient was seen by many providers prior to coming to see me. Patient was seen by Dr. Gareth Edwards who per the patient, performed a injection into his greater trochanter for possible bursitis. Patient underwent an MRI, and the MRI read demonstrated partial tear of the gluteus medius tendon, with mild tendinopathy in the glue minimus tendon with no acute fracture, but degenerative changes.   Patient states that he was referred initially to Dr. Rojelio Alonzo, at [facility], but his insurance did not cover [facility],

## 2023-07-13 ENCOUNTER — TELEPHONE (OUTPATIENT)
Dept: ORTHOPEDIC SURGERY | Age: 88
End: 2023-07-13

## 2023-07-13 NOTE — TELEPHONE ENCOUNTER
Vargas Alfonso with IR called in needing medications on IR injection order. Pt is scheduled for this upcoming Monday 7/17.

## 2023-07-17 ENCOUNTER — HOSPITAL ENCOUNTER (OUTPATIENT)
Dept: INTERVENTIONAL RADIOLOGY/VASCULAR | Age: 88
Discharge: HOME | End: 2023-07-19
Payer: MEDICARE

## 2023-07-17 VITALS — OXYGEN SATURATION: 98 %

## 2023-07-17 DIAGNOSIS — M54.32 BACK PAIN WITH LEFT-SIDED SCIATICA: ICD-10-CM

## 2023-07-17 PROCEDURE — 77002 NEEDLE LOCALIZATION BY XRAY: CPT

## 2023-07-17 PROCEDURE — 6360000002 HC RX W HCPCS: Performed by: FAMILY MEDICINE

## 2023-07-17 PROCEDURE — 20610 DRAIN/INJ JOINT/BURSA W/O US: CPT

## 2023-07-17 PROCEDURE — 6360000004 HC RX CONTRAST MEDICATION: Performed by: RADIOLOGY

## 2023-07-17 RX ORDER — BUPIVACAINE HYDROCHLORIDE 2.5 MG/ML
1 INJECTION, SOLUTION EPIDURAL; INFILTRATION; INTRACAUDAL ONCE
Status: COMPLETED | OUTPATIENT
Start: 2023-07-17 | End: 2023-07-17

## 2023-07-17 RX ORDER — METHYLPREDNISOLONE ACETATE 40 MG/ML
80 INJECTION, SUSPENSION INTRA-ARTICULAR; INTRALESIONAL; INTRAMUSCULAR; SOFT TISSUE ONCE
Status: COMPLETED | OUTPATIENT
Start: 2023-07-17 | End: 2023-07-17

## 2023-07-17 RX ADMIN — BUPIVACAINE HYDROCHLORIDE 2.5 MG: 2.5 INJECTION, SOLUTION EPIDURAL; INFILTRATION; INTRACAUDAL; PERINEURAL at 09:56

## 2023-07-17 RX ADMIN — METHYLPREDNISOLONE ACETATE 80 MG: 40 INJECTION, SUSPENSION INTRA-ARTICULAR; INTRALESIONAL; INTRAMUSCULAR; INTRASYNOVIAL; SOFT TISSUE at 09:57

## 2023-07-17 RX ADMIN — IOHEXOL 17 ML: 240 INJECTION, SOLUTION INTRATHECAL; INTRAVASCULAR; INTRAVENOUS; ORAL at 10:05

## 2023-07-17 NOTE — BRIEF OP NOTE
Brief Postoperative Note    Cassy Perea  YOB: 1934  [id number]    Pre-operative Diagnosis: left side hip pain    Post-operative Diagnosis: Same    Procedure: Piriformis injection; left hip injection    Anesthesia: Local    Surgeons/Assistants: Juan Batista MD    Estimated Blood Loss: less than 50     Complications: None    Specimens: Was Not Obtained    Findings: Multiple attempts were made  under fluoroscopy and US to enter piriformis without success. Contrast seen in the iliopsoas . Successful injection of left hip.     Electronically signed by ESTRELLA Gan on 7/17/2023 at 10:08 AM

## 2023-07-17 NOTE — OR NURSING
Baljeet Morillo notified per Cee Card via telephone he is having difficulty locating correct area to inject on left hip

## 2023-07-19 ENCOUNTER — HOSPITAL ENCOUNTER (OUTPATIENT)
Dept: MRI IMAGING | Facility: CLINIC | Age: 88
Discharge: HOME | End: 2023-07-21

## 2023-07-19 DIAGNOSIS — S76.312A STRAIN OF LEFT PIRIFORMIS MUSCLE, INITIAL ENCOUNTER: ICD-10-CM

## 2023-07-19 DIAGNOSIS — M54.32 SCIATICA OF LEFT SIDE: ICD-10-CM

## 2023-07-24 ENCOUNTER — TELEPHONE (OUTPATIENT)
Dept: ORTHOPEDIC SURGERY | Age: 88
End: 2023-07-24

## 2023-07-24 NOTE — TELEPHONE ENCOUNTER
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

## 2023-07-26 ENCOUNTER — TELEPHONE (OUTPATIENT)
Dept: ORTHOPEDIC SURGERY | Age: 88
End: 2023-07-26

## 2023-07-26 DIAGNOSIS — R52 PAIN: ICD-10-CM

## 2023-07-26 DIAGNOSIS — M54.32 SCIATICA OF LEFT SIDE: Primary | ICD-10-CM

## 2023-07-26 NOTE — TELEPHONE ENCOUNTER
Pt's daughter Dave Gunter called in regarding the lumbar spine MRI Dr. Leanne Irizarry has order for the pt. Pt had open heart surgery back on 06/01/23 and the they placed an pacemaker on the outside of his body but there is a wire that is inside his body that nobody locally is willing to do an MRI on him. States has contacted the [facility] and they state the wire is MRI safe and are willing to do the MRI but need the MRI order faxed over to them so they can schedule it    Fax # [phone]   P # [phone]    Pt currently has a follow up appt to go over results of MRI with  on 07/31/23 so we may need to reschedule that appt.     Please call daughter Dave Gunter once order is fax so she can help get appt set up @ [phone]--[facility] is on her dad's HIPAA

## 2023-07-31 ENCOUNTER — OFFICE VISIT (OUTPATIENT)
Dept: ORTHOPEDIC SURGERY | Age: 88
End: 2023-07-31

## 2023-07-31 VITALS — BODY MASS INDEX: 28.25 KG/M2 | HEIGHT: 67 IN | WEIGHT: 180 LBS

## 2023-07-31 DIAGNOSIS — M79.18 LEFT BUTTOCK PAIN: Primary | ICD-10-CM

## 2023-07-31 DIAGNOSIS — M54.32 SCIATICA OF LEFT SIDE: ICD-10-CM

## 2023-07-31 NOTE — PROGRESS NOTES
[facility]  [address] [place] [address]  Dept Phone: [phone]  Dept Fax: [phone]      Orthopaedic Trauma Clinic Follow Up      Subjective:       Addie Ayala is a 80y.o. year old male who presents to the clinic today for routine follow up regarding his left buttock pain that has been present since January of 2023 and slowly worsening over time. Patient presents today with his brother-in-law. Patient underwent an IR guided injection into the piriformis muscle which was unsuccessful per the interventional radiologist and decision was made to injection the surrounding area as well as the hip joint at that time. Patient states he had no relief from the injections and states his pain has worsened. He was not able to undergo an MRI of the lumber spine due to hardwires in his heart but did have an MRI of the thoracic and lumbar spine in January and March of 2023 which patient brought the disc and printed radiology reads to his appointment today. States his pain continues to remain positional, worse when going from sitting to standing especially first thing in the morning. States when he is seated, there is no pain unless he moves a certain way. States his buttock pain feels better if he sits on a pillow and elevates the left foot such as on a box, flexing the hip. Pain occasionally radiates down his leg but is for the most part localized to his left buttock region. Does complain of low back pain if standing for more than a few minutes such as to cook an egg so he then has to sit down which relieves the pain. He is now on pain medication per pain management which helps ease the pain but does not completely resolve the pain. Denies any new injuries or falls. Denies any numbness or tingling.      Review of Systems  Gen: no fever, chills, malaise  CV: no chest pain or palpitations  Resp: no cough or shortness of breath  GI: no nausea, vomiting, diarrhea, or

## 2023-08-01 ENCOUNTER — TELEPHONE (OUTPATIENT)
Dept: ORTHOPEDIC SURGERY | Age: 88
End: 2023-08-01

## 2023-10-09 ENCOUNTER — HOSPITAL ENCOUNTER (OUTPATIENT)
Dept: PREADMISSION TESTING | Age: 88
Discharge: HOME | End: 2023-10-13
Payer: MEDICARE

## 2023-10-09 VITALS
TEMPERATURE: 97.3 F | OXYGEN SATURATION: 97 % | HEART RATE: 66 BPM | WEIGHT: 180 LBS | DIASTOLIC BLOOD PRESSURE: 55 MMHG | BODY MASS INDEX: 28.25 KG/M2 | RESPIRATION RATE: 16 BRPM | SYSTOLIC BLOOD PRESSURE: 146 MMHG | HEIGHT: 67 IN

## 2023-10-09 LAB
ANION GAP SERPL CALCULATED.3IONS-SCNC: 13 MMOL/L (ref 9–17)
BACTERIA URNS QL MICRO: ABNORMAL
BASOPHILS # BLD: 0.07 K/UL (ref 0–0.2)
BASOPHILS NFR BLD: 1 % (ref 0–2)
BILIRUB UR QL STRIP: NEGATIVE
BUN SERPL-MCNC: 15 MG/DL (ref 8–23)
BUN/CREAT SERPL: 25 (ref 9–20)
CALCIUM SERPL-MCNC: 9.2 MG/DL (ref 8.6–10.4)
CHLORIDE SERPL-SCNC: 101 MMOL/L (ref 98–107)
CLARITY UR: CLEAR
CO2 SERPL-SCNC: 22 MMOL/L (ref 20–31)
COLOR UR: YELLOW
CREAT SERPL-MCNC: 0.6 MG/DL (ref 0.7–1.2)
EOSINOPHIL # BLD: 0.25 K/UL (ref 0–0.44)
EOSINOPHILS RELATIVE PERCENT: 3 % (ref 1–4)
EPI CELLS #/AREA URNS HPF: ABNORMAL /HPF (ref 0–5)
ERYTHROCYTE [DISTWIDTH] IN BLOOD BY AUTOMATED COUNT: 16.1 % (ref 11.8–14.4)
GFR SERPL CREATININE-BSD FRML MDRD: >60 ML/MIN/1.73M2
GLUCOSE SERPL-MCNC: 190 MG/DL (ref 70–99)
GLUCOSE UR STRIP-MCNC: NEGATIVE MG/DL
HCT VFR BLD AUTO: 39.9 % (ref 40.7–50.3)
HGB BLD-MCNC: 12.2 G/DL (ref 13–17)
HGB UR QL STRIP.AUTO: NEGATIVE
IMM GRANULOCYTES # BLD AUTO: 0.03 K/UL (ref 0–0.3)
IMM GRANULOCYTES NFR BLD: 0 %
INR PPP: 1.2
KETONES UR STRIP-MCNC: NEGATIVE MG/DL
LEUKOCYTE ESTERASE UR QL STRIP: ABNORMAL
LYMPHOCYTES NFR BLD: 1.69 K/UL (ref 1.1–3.7)
LYMPHOCYTES RELATIVE PERCENT: 18 % (ref 24–43)
MCH RBC QN AUTO: 27.3 PG (ref 25.2–33.5)
MCHC RBC AUTO-ENTMCNC: 30.6 G/DL (ref 28.4–34.8)
MCV RBC AUTO: 89.3 FL (ref 82.6–102.9)
MONOCYTES NFR BLD: 0.63 K/UL (ref 0.1–1.2)
MONOCYTES NFR BLD: 7 % (ref 3–12)
NEUTROPHILS NFR BLD: 71 % (ref 36–65)
NEUTS SEG NFR BLD: 6.96 K/UL (ref 1.5–8.1)
NITRITE UR QL STRIP: NEGATIVE
NRBC BLD-RTO: 0 PER 100 WBC
PARTIAL THROMBOPLASTIN TIME: 25.8 SEC (ref 23.9–33.8)
PH UR STRIP: 6 [PH] (ref 5–8)
PLATELET # BLD AUTO: 220 K/UL (ref 138–453)
PMV BLD AUTO: 9 FL (ref 8.1–13.5)
POTASSIUM SERPL-SCNC: 4.5 MMOL/L (ref 3.7–5.3)
PROT UR STRIP-MCNC: NEGATIVE MG/DL
PROTHROMBIN TIME: 14.7 SEC (ref 11.5–14.2)
RBC # BLD AUTO: 4.47 M/UL (ref 4.21–5.77)
RBC # BLD: ABNORMAL 10*6/UL
RBC #/AREA URNS HPF: ABNORMAL /HPF (ref 0–2)
SODIUM SERPL-SCNC: 136 MMOL/L (ref 135–144)
SP GR UR STRIP: 1.02 (ref 1–1.03)
UROBILINOGEN UR STRIP-ACNC: NORMAL EU/DL (ref 0–1)
WBC #/AREA URNS HPF: ABNORMAL /HPF (ref 0–5)
WBC OTHER # BLD: 9.6 K/UL (ref 3.5–11.3)

## 2023-10-09 PROCEDURE — 85025 COMPLETE CBC W/AUTO DIFF WBC: CPT

## 2023-10-09 PROCEDURE — 85730 THROMBOPLASTIN TIME PARTIAL: CPT

## 2023-10-09 PROCEDURE — 83036 HEMOGLOBIN GLYCOSYLATED A1C: CPT

## 2023-10-09 PROCEDURE — 81001 URINALYSIS AUTO W/SCOPE: CPT

## 2023-10-09 PROCEDURE — 80048 BASIC METABOLIC PNL TOTAL CA: CPT

## 2023-10-09 PROCEDURE — 36415 COLL VENOUS BLD VENIPUNCTURE: CPT

## 2023-10-09 PROCEDURE — 85610 PROTHROMBIN TIME: CPT

## 2023-10-09 PROCEDURE — 87641 MR-STAPH DNA AMP PROBE: CPT

## 2023-10-09 RX ORDER — AMIODARONE HYDROCHLORIDE 200 MG/1
200 TABLET ORAL DAILY
COMMUNITY
Start: 2023-07-10 | End: 2023-10-09

## 2023-10-09 RX ORDER — POLYETHYLENE GLYCOL 3350 17 G/17G
17 POWDER, FOR SOLUTION ORAL DAILY
COMMUNITY

## 2023-10-09 RX ORDER — HYDROCODONE BITARTRATE AND ACETAMINOPHEN 5; 325 MG/1; MG/1
1 TABLET ORAL EVERY 6 HOURS PRN
COMMUNITY

## 2023-10-09 RX ORDER — ASPIRIN 81 MG/1
81 TABLET ORAL DAILY
COMMUNITY

## 2023-10-09 RX ORDER — GABAPENTIN 100 MG/1
200 CAPSULE ORAL DAILY
COMMUNITY

## 2023-10-09 RX ORDER — ASPIRIN 81 MG
1 TABLET, DELAYED RELEASE (ENTERIC COATED) ORAL DAILY
COMMUNITY

## 2023-10-09 RX ORDER — AMIODARONE HYDROCHLORIDE 200 MG/1
200 TABLET ORAL DAILY
COMMUNITY

## 2023-10-09 RX ORDER — DOCUSATE SODIUM 100 MG/1
100 CAPSULE, LIQUID FILLED ORAL 2 TIMES DAILY
COMMUNITY

## 2023-10-09 ASSESSMENT — PAIN SCALES - GENERAL: PAINLEVEL_OUTOF10: 6

## 2023-10-09 ASSESSMENT — PAIN DESCRIPTION - LOCATION: LOCATION: BACK

## 2023-10-09 ASSESSMENT — PAIN DESCRIPTION - DESCRIPTORS: DESCRIPTORS: ACHING;SHARP

## 2023-10-09 NOTE — PRE-PROCEDURE INSTRUCTIONS
you.   For your safety, someone must remain with you for the first 24 hours after your surgery if you receive anesthesia or medication. If you do not have someone to stay with you, your procedure may be cancelled. As a patient at [facility] you can expect quality medical and nursing care that is centered on you individual needs. Our goal is to make your surgical experience as comfortable as possible.     Any questions about preparing for your surgery please call [phone].      ____________________________   ____________________________  Signature (Patient)                                 Signature (Nurse)                     Date

## 2023-10-10 LAB
EST. AVERAGE GLUCOSE BLD GHB EST-MCNC: 123 MG/DL
HBA1C MFR BLD: 5.9 % (ref 4–6)
MRSA, DNA, NASAL: NEGATIVE
SPECIMEN DESCRIPTION: NORMAL

## 2023-10-15 ENCOUNTER — ANESTHESIA EVENT (OUTPATIENT)
Dept: OPERATING ROOM | Age: 88
End: 2023-10-15
Payer: MEDICARE

## 2023-10-15 NOTE — ANESTHESIA PRE PROCEDURE
Department of Anesthesiology  Preprocedure Note       Name:  Matt Spine   Age:  80 y.o.  :  1934                                          MRN:  [id number]         Date:  10/15/2023      Surgeon: Juanito Ramey):  Andriy Leonard MD    Procedure: Procedure(s):  SPINAL CORD STIMULATOR IMPLANT TRIAL    Medications prior to admission:   Prior to Admission medications    Medication Sig Start Date End Date Taking? Authorizing Provider   HYDROcodone-acetaminophen (NORCO) 5-325 MG per tablet Take 1 tablet by mouth every 6 hours as needed for Pain. Max Daily Amount: 4 tablets    Billie Ruff MD   gabapentin (NEURONTIN) 100 MG capsule Take 2 capsules by mouth daily. Billie Ruff MD   Multiple Vitamin (DAILY VITAMIN) TABS Take 1 tablet by mouth daily    Billie Ruff MD   aspirin 81 MG EC tablet Take 1 tablet by mouth daily    Billie Ruff MD   polyethylene glycol (MIRALAX) 17 g PACK packet Take 17 g by mouth daily    Billie Ruff MD   docusate sodium (COLACE) 100 MG capsule Take 1 capsule by mouth 2 times daily    Billie Ruff MD   amiodarone (CORDARONE) 200 MG tablet Take 1 tablet by mouth daily    Billie Ruff MD   amLODIPine (NORVASC) 5 MG tablet Take 1 tablet by mouth daily    Billie Ruff MD   tadalafil (CIALIS) 5 MG tablet Take 5 mg by mouth as needed for Erectile Dysfunction  Patient not taking: Reported on 10/9/2023    Billie Ruff MD   hydrocortisone 2.5 % cream Apply 1 application topically 2 times daily Apply topically 2 times daily. externally  Patient not taking: Reported on 10/9/2023    Billie Ruff MD   Mometasone Furoate (NASONEX NA) by Nasal route  Patient not taking: Reported on 3/29/2023    Billie Ruff MD   ciclopirox (PENLAC) 8 % solution Apply 1 application topically nightly Apply topically nightly.     Billie Ruff MD   lisinopril (PRINIVIL;ZESTRIL) 10 MG tablet Take 2 tablets by mouth

## 2023-10-16 ENCOUNTER — HOSPITAL ENCOUNTER (OUTPATIENT)
Age: 88
Setting detail: OUTPATIENT SURGERY
Discharge: HOME | End: 2023-10-16
Attending: ANESTHESIOLOGY | Admitting: ANESTHESIOLOGY
Payer: MEDICARE

## 2023-10-16 ENCOUNTER — APPOINTMENT (OUTPATIENT)
Dept: GENERAL RADIOLOGY | Age: 88
End: 2023-10-16
Attending: ANESTHESIOLOGY
Payer: MEDICARE

## 2023-10-16 ENCOUNTER — ANESTHESIA (OUTPATIENT)
Dept: OPERATING ROOM | Age: 88
End: 2023-10-16
Payer: MEDICARE

## 2023-10-16 VITALS
TEMPERATURE: 96.8 F | SYSTOLIC BLOOD PRESSURE: 95 MMHG | RESPIRATION RATE: 20 BRPM | HEIGHT: 67 IN | WEIGHT: 175 LBS | OXYGEN SATURATION: 96 % | DIASTOLIC BLOOD PRESSURE: 51 MMHG | HEART RATE: 69 BPM | BODY MASS INDEX: 27.47 KG/M2

## 2023-10-16 LAB — GLUCOSE BLD-MCNC: 158 MG/DL (ref 75–110)

## 2023-10-16 PROCEDURE — 6360000002 HC RX W HCPCS: Performed by: NURSE ANESTHETIST, CERTIFIED REGISTERED

## 2023-10-16 PROCEDURE — 2500000003 HC RX 250 WO HCPCS: Performed by: NURSE ANESTHETIST, CERTIFIED REGISTERED

## 2023-10-16 PROCEDURE — C1778 LEAD, NEUROSTIMULATOR: HCPCS | Performed by: ANESTHESIOLOGY

## 2023-10-16 PROCEDURE — 2580000003 HC RX 258

## 2023-10-16 PROCEDURE — 3700000000 HC ANESTHESIA ATTENDED CARE: Performed by: ANESTHESIOLOGY

## 2023-10-16 PROCEDURE — 82947 ASSAY GLUCOSE BLOOD QUANT: CPT

## 2023-10-16 PROCEDURE — 7100000011 HC PHASE II RECOVERY - ADDTL 15 MIN: Performed by: ANESTHESIOLOGY

## 2023-10-16 PROCEDURE — 6370000000 HC RX 637 (ALT 250 FOR IP): Performed by: ANESTHESIOLOGY

## 2023-10-16 PROCEDURE — 6360000002 HC RX W HCPCS: Performed by: ANESTHESIOLOGY

## 2023-10-16 PROCEDURE — 3600000054 HC PAIN LEVEL 3 BASE: Performed by: ANESTHESIOLOGY

## 2023-10-16 PROCEDURE — 2709999900 HC NON-CHARGEABLE SUPPLY: Performed by: ANESTHESIOLOGY

## 2023-10-16 PROCEDURE — 2500000003 HC RX 250 WO HCPCS: Performed by: ANESTHESIOLOGY

## 2023-10-16 PROCEDURE — 7100000010 HC PHASE II RECOVERY - FIRST 15 MIN: Performed by: ANESTHESIOLOGY

## 2023-10-16 PROCEDURE — 2580000003 HC RX 258: Performed by: NURSE ANESTHETIST, CERTIFIED REGISTERED

## 2023-10-16 PROCEDURE — 3700000001 HC ADD 15 MINUTES (ANESTHESIA): Performed by: ANESTHESIOLOGY

## 2023-10-16 PROCEDURE — 3600000055 HC PAIN LEVEL 3 ADDL 15 MIN: Performed by: ANESTHESIOLOGY

## 2023-10-16 DEVICE — 50CM 16 CONTACT TRIAL LEAD KIT
Type: IMPLANTABLE DEVICE | Site: BACK | Status: FUNCTIONAL
Brand: INFINION™  16

## 2023-10-16 RX ORDER — MORPHINE SULFATE 2 MG/ML
2 INJECTION, SOLUTION INTRAMUSCULAR; INTRAVENOUS EVERY 5 MIN PRN
Status: DISCONTINUED | OUTPATIENT
Start: 2023-10-16 | End: 2023-10-16 | Stop reason: HOSPADM

## 2023-10-16 RX ORDER — LABETALOL HYDROCHLORIDE 5 MG/ML
10 INJECTION, SOLUTION INTRAVENOUS
Status: DISCONTINUED | OUTPATIENT
Start: 2023-10-16 | End: 2023-10-16 | Stop reason: HOSPADM

## 2023-10-16 RX ORDER — LIDOCAINE HYDROCHLORIDE 20 MG/ML
INJECTION, SOLUTION EPIDURAL; INFILTRATION; INTRACAUDAL; PERINEURAL PRN
Status: DISCONTINUED | OUTPATIENT
Start: 2023-10-16 | End: 2023-10-16 | Stop reason: SDUPTHER

## 2023-10-16 RX ORDER — SODIUM CHLORIDE 0.9 % (FLUSH) 0.9 %
5-40 SYRINGE (ML) INJECTION PRN
Status: DISCONTINUED | OUTPATIENT
Start: 2023-10-16 | End: 2023-10-16 | Stop reason: HOSPADM

## 2023-10-16 RX ORDER — SODIUM CHLORIDE, SODIUM LACTATE, POTASSIUM CHLORIDE, CALCIUM CHLORIDE 600; 310; 30; 20 MG/100ML; MG/100ML; MG/100ML; MG/100ML
INJECTION, SOLUTION INTRAVENOUS CONTINUOUS PRN
Status: DISCONTINUED | OUTPATIENT
Start: 2023-10-16 | End: 2023-10-16 | Stop reason: SDUPTHER

## 2023-10-16 RX ORDER — DIPHENHYDRAMINE HYDROCHLORIDE 50 MG/ML
12.5 INJECTION INTRAMUSCULAR; INTRAVENOUS
Status: DISCONTINUED | OUTPATIENT
Start: 2023-10-16 | End: 2023-10-16 | Stop reason: HOSPADM

## 2023-10-16 RX ORDER — IPRATROPIUM BROMIDE AND ALBUTEROL SULFATE 2.5; .5 MG/3ML; MG/3ML
1 SOLUTION RESPIRATORY (INHALATION) ONCE
Status: COMPLETED | OUTPATIENT
Start: 2023-10-16 | End: 2023-10-16

## 2023-10-16 RX ORDER — SODIUM CHLORIDE 0.9 % (FLUSH) 0.9 %
5-40 SYRINGE (ML) INJECTION EVERY 12 HOURS SCHEDULED
Status: DISCONTINUED | OUTPATIENT
Start: 2023-10-16 | End: 2023-10-16 | Stop reason: HOSPADM

## 2023-10-16 RX ORDER — PROPOFOL 10 MG/ML
INJECTION, EMULSION INTRAVENOUS CONTINUOUS PRN
Status: DISCONTINUED | OUTPATIENT
Start: 2023-10-16 | End: 2023-10-16 | Stop reason: SDUPTHER

## 2023-10-16 RX ORDER — METOCLOPRAMIDE HYDROCHLORIDE 5 MG/ML
10 INJECTION INTRAMUSCULAR; INTRAVENOUS
Status: DISCONTINUED | OUTPATIENT
Start: 2023-10-16 | End: 2023-10-16 | Stop reason: HOSPADM

## 2023-10-16 RX ORDER — LIDOCAINE HYDROCHLORIDE 10 MG/ML
1 INJECTION, SOLUTION EPIDURAL; INFILTRATION; INTRACAUDAL; PERINEURAL
Status: DISCONTINUED | OUTPATIENT
Start: 2023-10-17 | End: 2023-10-16 | Stop reason: HOSPADM

## 2023-10-16 RX ORDER — ONDANSETRON 2 MG/ML
4 INJECTION INTRAMUSCULAR; INTRAVENOUS
Status: DISCONTINUED | OUTPATIENT
Start: 2023-10-16 | End: 2023-10-16 | Stop reason: HOSPADM

## 2023-10-16 RX ORDER — SODIUM CHLORIDE 9 MG/ML
INJECTION, SOLUTION INTRAVENOUS PRN
Status: DISCONTINUED | OUTPATIENT
Start: 2023-10-16 | End: 2023-10-16 | Stop reason: HOSPADM

## 2023-10-16 RX ORDER — CLINDAMYCIN PHOSPHATE 900 MG/50ML
900 INJECTION INTRAVENOUS ONCE
Status: COMPLETED | OUTPATIENT
Start: 2023-10-16 | End: 2023-10-16

## 2023-10-16 RX ORDER — HYDROMORPHONE HYDROCHLORIDE 1 MG/ML
0.5 INJECTION, SOLUTION INTRAMUSCULAR; INTRAVENOUS; SUBCUTANEOUS EVERY 5 MIN PRN
Status: DISCONTINUED | OUTPATIENT
Start: 2023-10-16 | End: 2023-10-16 | Stop reason: HOSPADM

## 2023-10-16 RX ORDER — SODIUM CHLORIDE 9 MG/ML
INJECTION, SOLUTION INTRAVENOUS CONTINUOUS
Status: DISCONTINUED | OUTPATIENT
Start: 2023-10-16 | End: 2023-10-16

## 2023-10-16 RX ORDER — FENTANYL CITRATE 50 UG/ML
INJECTION, SOLUTION INTRAMUSCULAR; INTRAVENOUS PRN
Status: DISCONTINUED | OUTPATIENT
Start: 2023-10-16 | End: 2023-10-16 | Stop reason: SDUPTHER

## 2023-10-16 RX ORDER — SODIUM CHLORIDE, SODIUM LACTATE, POTASSIUM CHLORIDE, CALCIUM CHLORIDE 600; 310; 30; 20 MG/100ML; MG/100ML; MG/100ML; MG/100ML
INJECTION, SOLUTION INTRAVENOUS CONTINUOUS
Status: DISCONTINUED | OUTPATIENT
Start: 2023-10-16 | End: 2023-10-16 | Stop reason: HOSPADM

## 2023-10-16 RX ADMIN — SODIUM CHLORIDE, POTASSIUM CHLORIDE, SODIUM LACTATE AND CALCIUM CHLORIDE: 600; 310; 30; 20 INJECTION, SOLUTION INTRAVENOUS at 13:42

## 2023-10-16 RX ADMIN — LIDOCAINE HYDROCHLORIDE 40 MG: 20 INJECTION, SOLUTION EPIDURAL; INFILTRATION; INTRACAUDAL; PERINEURAL at 14:40

## 2023-10-16 RX ADMIN — FENTANYL CITRATE 50 MCG: 50 INJECTION INTRAMUSCULAR; INTRAVENOUS at 14:40

## 2023-10-16 RX ADMIN — PROPOFOL 25 MCG/KG/MIN: 10 INJECTION, EMULSION INTRAVENOUS at 14:40

## 2023-10-16 RX ADMIN — CLINDAMYCIN PHOSPHATE 900 MG: 900 INJECTION, SOLUTION INTRAVENOUS at 14:45

## 2023-10-16 RX ADMIN — IPRATROPIUM BROMIDE AND ALBUTEROL SULFATE 1 DOSE: .5; 2.5 SOLUTION RESPIRATORY (INHALATION) at 14:08

## 2023-10-16 RX ADMIN — SODIUM CHLORIDE, POTASSIUM CHLORIDE, SODIUM LACTATE AND CALCIUM CHLORIDE: 600; 310; 30; 20 INJECTION, SOLUTION INTRAVENOUS at 14:40

## 2023-10-16 ASSESSMENT — COPD QUESTIONNAIRES: CAT_SEVERITY: MILD

## 2023-10-16 ASSESSMENT — PAIN - FUNCTIONAL ASSESSMENT: PAIN_FUNCTIONAL_ASSESSMENT: 0-10

## 2023-10-16 NOTE — H&P
cooperative, alert and oriented x3, in no acute distress. Ill-appearing. Heart: Irregular rhythm. Heart sounds are normal. HR 82 regular rate without murmur, gallop or rub. Lungs: Diminished to auscultation- Duoneb ordered per anesthesia. Normal respiratory effort with equal expansion, good air exchange, unlabored  without wheezes or rales bilaterally   Abdomen: soft, nontender, nondistended with bowel sounds active. Skin: Healing sore to left foot- nothing open or draining. Scattered bruising. Labs:  Recent Labs     10/09/23  1317   HGB 12.2*   HCT 39.9*   WBC 9.6   MCV 89.3         K 4.5      CO2 22   BUN 15   CREATININE 0.6*   GLUCOSE 190*   INR 1.2   PROTIME 14.7*   APTT 25.8       No results for input(s): \"COVID19\" in the last 720 hours.     ISA Galindo CNP  Electronically signed 10/16/2023 at 1:47 PM

## 2023-10-16 NOTE — OP NOTE
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  Spinal cord stimulator trial, x2 sixteen electrodes were placed into the posterior epidural space percutaneously at T7-T9.  2.  Examination under fluoroscopy. 3.  Intraoperative and postoperative programming, complex. POSTOPERATIVE DIAGNOSIS:  Same    ESTIMATED BLOOD COUNT:    None. COMPLICATIONS:    None. SPECIMENS:  None. MONITORS:  Standard ASA monitors were placed during the entire procedure and the immediate postoperative period. The patient was communicating with us throughout the whole entire procedure. PREPARATION OF THE PROCEDURE:  Oxygen saturation and vital signs were monitored continuously throughout the entire procedure in order to interact and give feedback. The x-ray technician was supervised and instructed to operate the fluoroscopy machine. INFORMED CONSENT:   The risks, benefits and alternatives of the procedure were discussed with the patient. The patient was given opportunity to ask questions regarding the procedure, it's indications and the associated risks. The risks of the procedure discussed include infection, bleeding, allergic reaction, dural puncture, headache, nerve injuries, spinal cord injury, and cardiovascular and CNS side effects with possibility of vascular entry of medications. I also informed the patient of potential side effects or reactions to the medications potentially used during the procedure including sedatives, narcotics, nonionic contrast agents, anesthetics, and corticosteroids. The patient was informed both verbally and in writing. The patient understood the informed consent and desired to have the procedure performed.     INDICATIONS:  The patient has tried several conservative treatments with no

## 2023-10-16 NOTE — ANESTHESIA POSTPROCEDURE EVALUATION
Department of Anesthesiology  Postprocedure Note    Patient: Pratik Alfonso  MRN: [id number]  YOB: 1934  Date of evaluation: 10/16/2023      Procedure Summary     Date: 10/16/23 Room / Location: [address] - INPATIENT    Anesthesia Start: 5621 Anesthesia Stop: 8311    Procedure: SPINAL CORD STIMULATOR IMPLANT TRIAL Diagnosis:       Spinal stenosis of lumbar region, unspecified whether neurogenic claudication present      (Spinal stenosis of lumbar region, unspecified whether neurogenic claudication present [M48.061])    Surgeons: Benedicto Brush MD Responsible Provider: Deepthi Zhang MD    Anesthesia Type: MAC ASA Status: 3          Anesthesia Type: No value filed.     Juanjo Phase I: Juanjo Score: 10    Juanjo Phase II: Juanjo Score: 10      Anesthesia Post Evaluation    Patient location during evaluation: PACU  Patient participation: complete - patient participated  Level of consciousness: awake and alert  Airway patency: patent  Nausea & Vomiting: no nausea and no vomiting  Complications: no  Cardiovascular status: hemodynamically stable  Respiratory status: acceptable  Hydration status: stable  Pain management: adequate

## 2023-10-16 NOTE — DISCHARGE INSTRUCTIONS
DISCHARGE INSTRUCTIONS FOR SPINAL CORD STIMULATOR TRIAL      Resume your pre-procedure level of activity. This is a functional trial; therefore, you must function and not stay in bed or in a recliner. ALWAYS have the trial stimulator unit in hand or attached to your belt whenever you are walking. Avoid activities that require lifting more than 10 pounds, bending at the waist, twisting, and reaching overhead. Avoid strenuous activities as this may dislodge the stimulator lead. The use of slip-on shoes is suggested. During this trial and/or while taking narcotic ain medication, under not circumstances, should you be driving a car or operating heavy machinery. Do not make any important decisions for 24 hours. You may feel capable of performing these activities, bur for your safety and the safety of others, they are strictly forbidden. You must keep the surgical dressing clean and dry. No tub bathing. No showering. Sponge bath permitted with attention not to wet wound/dressing. Resume your regular diet as tolerated. You may resume all of your preoperative medications (see exceptions below) unless otherwise instructed. **Aspirin, aspirin containing products, non-steroidal anti-inflammatory drugs, antiplatelet drugs (Plavix, Ticlid, Aggrenox and anticoagulant drugs (Coumadin, Heparin) should NOT be resumed until after the stimulator lead is removed, unless you have been otherwise instructed. Soreness at the catheter insertion site in your back is to be expected. Resume your previously prescribed pain medication, be be taken as needed for pain. Please call the office if you experience any of the following: fever (greater than 101.4 oral), chills, severe back pain, drainage, redness, swelling at the procedure site prolonged dizziness/lightheadedness (longer than 24 hours).  If you develop new weakness in your legs or arms and/or difficulty walking that is worsening, this may be a sign of a

## 2023-11-13 ENCOUNTER — ANESTHESIA EVENT (OUTPATIENT)
Dept: OPERATING ROOM | Age: 88
End: 2023-11-13
Payer: MEDICARE

## 2023-11-13 ENCOUNTER — HOSPITAL ENCOUNTER (OUTPATIENT)
Age: 88
Setting detail: OUTPATIENT SURGERY
Discharge: HOME | End: 2023-11-13
Attending: ANESTHESIOLOGY | Admitting: ANESTHESIOLOGY
Payer: MEDICARE

## 2023-11-13 ENCOUNTER — APPOINTMENT (OUTPATIENT)
Dept: GENERAL RADIOLOGY | Age: 88
End: 2023-11-13
Attending: ANESTHESIOLOGY
Payer: MEDICARE

## 2023-11-13 ENCOUNTER — ANESTHESIA (OUTPATIENT)
Dept: OPERATING ROOM | Age: 88
End: 2023-11-13
Payer: MEDICARE

## 2023-11-13 VITALS
HEART RATE: 63 BPM | RESPIRATION RATE: 20 BRPM | DIASTOLIC BLOOD PRESSURE: 45 MMHG | TEMPERATURE: 96.8 F | SYSTOLIC BLOOD PRESSURE: 95 MMHG | OXYGEN SATURATION: 97 % | HEIGHT: 67 IN | BODY MASS INDEX: 27.47 KG/M2 | WEIGHT: 175 LBS

## 2023-11-13 LAB
BASOPHILS # BLD: 0.06 K/UL (ref 0–0.2)
BASOPHILS NFR BLD: 1 % (ref 0–2)
EOSINOPHIL # BLD: 0.11 K/UL (ref 0–0.44)
EOSINOPHILS RELATIVE PERCENT: 2 % (ref 1–4)
ERYTHROCYTE [DISTWIDTH] IN BLOOD BY AUTOMATED COUNT: 15.3 % (ref 11.8–14.4)
GLUCOSE BLD-MCNC: 143 MG/DL (ref 75–110)
GLUCOSE BLD-MCNC: 193 MG/DL (ref 75–110)
HCT VFR BLD AUTO: 38.4 % (ref 40.7–50.3)
HGB BLD-MCNC: 12.1 G/DL (ref 13–17)
IMM GRANULOCYTES # BLD AUTO: 0.04 K/UL (ref 0–0.3)
IMM GRANULOCYTES NFR BLD: 1 %
LYMPHOCYTES NFR BLD: 1 K/UL (ref 1.1–3.7)
LYMPHOCYTES RELATIVE PERCENT: 14 % (ref 24–43)
MCH RBC QN AUTO: 28.7 PG (ref 25.2–33.5)
MCHC RBC AUTO-ENTMCNC: 31.5 G/DL (ref 28.4–34.8)
MCV RBC AUTO: 91 FL (ref 82.6–102.9)
MONOCYTES NFR BLD: 0.56 K/UL (ref 0.1–1.2)
MONOCYTES NFR BLD: 8 % (ref 3–12)
NEUTROPHILS NFR BLD: 74 % (ref 36–65)
NEUTS SEG NFR BLD: 5.17 K/UL (ref 1.5–8.1)
NRBC BLD-RTO: 0 PER 100 WBC
PLATELET # BLD AUTO: 201 K/UL (ref 138–453)
PMV BLD AUTO: 9.3 FL (ref 8.1–13.5)
RBC # BLD AUTO: 4.22 M/UL (ref 4.21–5.77)
RBC # BLD: ABNORMAL 10*6/UL
WBC OTHER # BLD: 6.9 K/UL (ref 3.5–11.3)

## 2023-11-13 PROCEDURE — 36415 COLL VENOUS BLD VENIPUNCTURE: CPT

## 2023-11-13 PROCEDURE — 2580000003 HC RX 258: Performed by: ANESTHESIOLOGY

## 2023-11-13 PROCEDURE — 3600000054 HC PAIN LEVEL 3 BASE: Performed by: ANESTHESIOLOGY

## 2023-11-13 PROCEDURE — A4217 STERILE WATER/SALINE, 500 ML: HCPCS | Performed by: ANESTHESIOLOGY

## 2023-11-13 PROCEDURE — 3700000000 HC ANESTHESIA ATTENDED CARE: Performed by: ANESTHESIOLOGY

## 2023-11-13 PROCEDURE — 2709999900 HC NON-CHARGEABLE SUPPLY: Performed by: ANESTHESIOLOGY

## 2023-11-13 PROCEDURE — 2720000010 HC SURG SUPPLY STERILE: Performed by: ANESTHESIOLOGY

## 2023-11-13 PROCEDURE — C1787 PATIENT PROGR, NEUROSTIM: HCPCS | Performed by: ANESTHESIOLOGY

## 2023-11-13 PROCEDURE — 2500000003 HC RX 250 WO HCPCS: Performed by: SPECIALIST

## 2023-11-13 PROCEDURE — 82947 ASSAY GLUCOSE BLOOD QUANT: CPT

## 2023-11-13 PROCEDURE — 6360000002 HC RX W HCPCS: Performed by: ANESTHESIOLOGY

## 2023-11-13 PROCEDURE — C1778 LEAD, NEUROSTIMULATOR: HCPCS | Performed by: ANESTHESIOLOGY

## 2023-11-13 PROCEDURE — 2780000010 HC IMPLANT OTHER: Performed by: ANESTHESIOLOGY

## 2023-11-13 PROCEDURE — 6360000002 HC RX W HCPCS: Performed by: SPECIALIST

## 2023-11-13 PROCEDURE — 2500000003 HC RX 250 WO HCPCS: Performed by: ANESTHESIOLOGY

## 2023-11-13 PROCEDURE — 7100000011 HC PHASE II RECOVERY - ADDTL 15 MIN: Performed by: ANESTHESIOLOGY

## 2023-11-13 PROCEDURE — 85025 COMPLETE CBC W/AUTO DIFF WBC: CPT

## 2023-11-13 PROCEDURE — C1713 ANCHOR/SCREW BN/BN,TIS/BN: HCPCS | Performed by: ANESTHESIOLOGY

## 2023-11-13 PROCEDURE — C1820 GENERATOR NEURO RECHG BAT SY: HCPCS | Performed by: ANESTHESIOLOGY

## 2023-11-13 PROCEDURE — 7100000010 HC PHASE II RECOVERY - FIRST 15 MIN: Performed by: ANESTHESIOLOGY

## 2023-11-13 PROCEDURE — 3700000001 HC ADD 15 MINUTES (ANESTHESIA): Performed by: ANESTHESIOLOGY

## 2023-11-13 PROCEDURE — 3600000055 HC PAIN LEVEL 3 ADDL 15 MIN: Performed by: ANESTHESIOLOGY

## 2023-11-13 DEVICE — IMPLANTABLE DEVICE: Type: IMPLANTABLE DEVICE | Site: BACK | Status: FUNCTIONAL

## 2023-11-13 DEVICE — REMOTE CONTROL KIT
Type: IMPLANTABLE DEVICE | Site: BACK | Status: FUNCTIONAL
Brand: FREELINK™

## 2023-11-13 DEVICE — ANCHOR
Type: IMPLANTABLE DEVICE | Site: BACK | Status: FUNCTIONAL
Brand: CLIK™ X

## 2023-11-13 DEVICE — 50CM 8 CONTACT LEAD KIT
Type: IMPLANTABLE DEVICE | Site: BACK | Status: FUNCTIONAL
Brand: LINEAR™ ST

## 2023-11-13 RX ORDER — LIDOCAINE HYDROCHLORIDE 10 MG/ML
1 INJECTION, SOLUTION EPIDURAL; INFILTRATION; INTRACAUDAL; PERINEURAL
Status: DISCONTINUED | OUTPATIENT
Start: 2023-11-14 | End: 2023-11-13 | Stop reason: HOSPADM

## 2023-11-13 RX ORDER — SODIUM CHLORIDE 9 MG/ML
INJECTION, SOLUTION INTRAVENOUS PRN
Status: DISCONTINUED | OUTPATIENT
Start: 2023-11-13 | End: 2023-11-13 | Stop reason: HOSPADM

## 2023-11-13 RX ORDER — SODIUM CHLORIDE 0.9 % (FLUSH) 0.9 %
5-40 SYRINGE (ML) INJECTION PRN
Status: DISCONTINUED | OUTPATIENT
Start: 2023-11-13 | End: 2023-11-13 | Stop reason: HOSPADM

## 2023-11-13 RX ORDER — PROPOFOL 10 MG/ML
INJECTION, EMULSION INTRAVENOUS PRN
Status: DISCONTINUED | OUTPATIENT
Start: 2023-11-13 | End: 2023-11-13 | Stop reason: SDUPTHER

## 2023-11-13 RX ORDER — SODIUM CHLORIDE, SODIUM LACTATE, POTASSIUM CHLORIDE, CALCIUM CHLORIDE 600; 310; 30; 20 MG/100ML; MG/100ML; MG/100ML; MG/100ML
INJECTION, SOLUTION INTRAVENOUS CONTINUOUS
Status: DISCONTINUED | OUTPATIENT
Start: 2023-11-13 | End: 2023-11-13 | Stop reason: HOSPADM

## 2023-11-13 RX ORDER — FENTANYL CITRATE 50 UG/ML
INJECTION, SOLUTION INTRAMUSCULAR; INTRAVENOUS PRN
Status: DISCONTINUED | OUTPATIENT
Start: 2023-11-13 | End: 2023-11-13 | Stop reason: SDUPTHER

## 2023-11-13 RX ORDER — LIDOCAINE HYDROCHLORIDE 20 MG/ML
INJECTION, SOLUTION EPIDURAL; INFILTRATION; INTRACAUDAL; PERINEURAL PRN
Status: DISCONTINUED | OUTPATIENT
Start: 2023-11-13 | End: 2023-11-13 | Stop reason: SDUPTHER

## 2023-11-13 RX ORDER — SODIUM CHLORIDE 0.9 % (FLUSH) 0.9 %
5-40 SYRINGE (ML) INJECTION EVERY 12 HOURS SCHEDULED
Status: DISCONTINUED | OUTPATIENT
Start: 2023-11-13 | End: 2023-11-13 | Stop reason: HOSPADM

## 2023-11-13 RX ORDER — SODIUM CHLORIDE 9 MG/ML
INJECTION, SOLUTION INTRAVENOUS CONTINUOUS
Status: DISCONTINUED | OUTPATIENT
Start: 2023-11-13 | End: 2023-11-13 | Stop reason: HOSPADM

## 2023-11-13 RX ADMIN — FENTANYL CITRATE 50 MCG: 50 INJECTION INTRAMUSCULAR; INTRAVENOUS at 13:13

## 2023-11-13 RX ADMIN — VANCOMYCIN HYDROCHLORIDE 1000 MG: 1 INJECTION, POWDER, LYOPHILIZED, FOR SOLUTION INTRAVENOUS at 13:21

## 2023-11-13 RX ADMIN — PROPOFOL 50 MG: 10 INJECTION, EMULSION INTRAVENOUS at 13:43

## 2023-11-13 RX ADMIN — LIDOCAINE HYDROCHLORIDE 60 MG: 20 INJECTION, SOLUTION EPIDURAL; INFILTRATION; INTRACAUDAL; PERINEURAL at 13:43

## 2023-11-13 RX ADMIN — FENTANYL CITRATE 50 MCG: 50 INJECTION INTRAMUSCULAR; INTRAVENOUS at 13:17

## 2023-11-13 RX ADMIN — SODIUM CHLORIDE, POTASSIUM CHLORIDE, SODIUM LACTATE AND CALCIUM CHLORIDE: 600; 310; 30; 20 INJECTION, SOLUTION INTRAVENOUS at 13:04

## 2023-11-13 RX ADMIN — VANCOMYCIN HYDROCHLORIDE 1000 MG: 1 INJECTION, POWDER, LYOPHILIZED, FOR SOLUTION INTRAVENOUS at 13:00

## 2023-11-13 RX ADMIN — PROPOFOL 50 MG: 10 INJECTION, EMULSION INTRAVENOUS at 13:57

## 2023-11-13 ASSESSMENT — PAIN - FUNCTIONAL ASSESSMENT: PAIN_FUNCTIONAL_ASSESSMENT: 0-10

## 2023-11-13 NOTE — OP NOTE
Operative Note      Patient: Gerber Israel  YOB: 1934  MRN: [id number]    Date of Procedure: 11/13/2023    Pre-Op Diagnosis Codes:     * Spinal stenosis of lumbar region, unspecified whether neurogenic claudication present [M48.061]    Post-Op Diagnosis: Same       PROCEDURE:  1.  Spinal cord stimulator implant with placement of octad electrodes x2 at T8-9.  2.  Placement of a pulse generator. 3.  Examination under fluoroscopy. 4.  Intraoperative and postoperative programming, complex. POSTOPERATIVE DIAGNOSIS:  Same    ESTIMATED BLOOD COUNT:    100 ml. COMPLICATIONS:    None. SPECIMENS:  None. MONITORS:  Standard ASA monitors were placed during the entire procedure and the immediate postoperative period. The patient was communicating with us throughout the whole entire procedure. PREPARATION OF THE PROCEDURE:  Oxygen saturation and vital signs were monitored continuously throughout the entire procedure in order to interact and give feedback. The x-ray technician was supervised and instructed to operate the fluoroscopy machine. INFORMED CONSENT:   The risks, benefits and alternatives of the procedure were discussed with the patient. The patient was given opportunity to ask questions regarding the procedure, it's indications and the associated risks. The risks of the procedure discussed include infection, bleeding, allergic reaction, dural puncture, headache, nerve injuries, spinal cord injury, and cardiovascular and CNS side effects with possibility of vascular entry of medications. I also informed the patient of potential side effects or reactions to the medications potentially used during the procedure including sedatives, narcotics, nonionic contrast agents, anesthetics, and corticosteroids. The patient was informed both verbally and in writing. The patient understood the informed consent and desired to have the procedure performed.     INDICATIONS:  The patient has more than

## 2023-11-13 NOTE — ANESTHESIA POSTPROCEDURE EVALUATION
Department of Anesthesiology  Postprocedure Note    Patient: Nicole Peterson  MRN: [id number]  YOB: 1934  Date of evaluation: 11/13/2023      Procedure Summary     Date: 11/13/23 Room / Location: [address] - INPATIENT    Anesthesia Start: 3694 Anesthesia Stop: 7432    Procedure: SPINAL CORD  STIMULATOR IMPLANT, 2 LEADS, 1 BATTERY Diagnosis:       Spinal stenosis of lumbar region, unspecified whether neurogenic claudication present      (Spinal stenosis of lumbar region, unspecified whether neurogenic claudication present [M48.061])    Surgeons: Clem Bedoya MD Responsible Provider: Robert Beltrán MD    Anesthesia Type: MAC ASA Status: 3          Anesthesia Type: No value filed.     Juanjo Phase I:      Juanjo Phase II: Juanjo Score: 9      Anesthesia Post Evaluation    Patient location during evaluation: PACU  Patient participation: complete - patient participated  Level of consciousness: awake and alert  Airway patency: patent  Nausea & Vomiting: no nausea and no vomiting  Complications: no  Cardiovascular status: hemodynamically stable  Respiratory status: acceptable  Hydration status: euvolemic  Pain management: adequate

## 2023-11-13 NOTE — ANESTHESIA PRE PROCEDURE
Department of Anesthesiology  Preprocedure Note       Name:  Otoniel Allen   Age:  80 y.o.  :  1934                                          MRN:  [id number]         Date:  2023      Surgeon: Denver Osullivan):  Troy Cummings MD    Procedure: Procedure(s):  SPINAL CORD  STIMULATOR IMPLANT    Medications prior to admission:   Prior to Admission medications    Medication Sig Start Date End Date Taking? Authorizing Provider   HYDROcodone-acetaminophen (NORCO) 5-325 MG per tablet Take 1 tablet by mouth every 6 hours as needed for Pain. Billie Ruff MD   gabapentin (NEURONTIN) 100 MG capsule Take 2 capsules by mouth daily. ProviderBillie MD   Multiple Vitamin (DAILY VITAMIN) TABS Take 1 tablet by mouth daily    Billie Ruff MD   aspirin 81 MG EC tablet Take 1 tablet by mouth daily    Billie Ruff MD   polyethylene glycol (MIRALAX) 17 g PACK packet Take 17 g by mouth daily    Billie Ruff MD   docusate sodium (COLACE) 100 MG capsule Take 1 capsule by mouth 2 times daily    Billie Ruff MD   amiodarone (CORDARONE) 200 MG tablet Take 1 tablet by mouth daily    Billie Ruff MD   amLODIPine (NORVASC) 5 MG tablet Take 1 tablet by mouth daily    Billie Ruff MD   tadalafil (CIALIS) 5 MG tablet Take 5 mg by mouth as needed for Erectile Dysfunction    Billie Ruff MD   hydrocortisone 2.5 % cream Apply 1 application topically 2 times daily Apply topically 2 times daily. externally    Billie Ruff MD   Mometasone Furoate (NASONEX NA) by Nasal route  Patient not taking: Reported on 3/29/2023    Billie Ruff MD   ciclopirox (PENLAC) 8 % solution Apply 1 application topically nightly Apply topically nightly.     Billie Ruff MD   lisinopril (PRINIVIL;ZESTRIL) 10 MG tablet Take 2 tablets by mouth daily 3/22/10   Billie Ruff MD   levothyroxine (SYNTHROID) 125 MCG tablet Take 1 tablet by mouth Daily

## 2023-11-13 NOTE — DISCHARGE INSTRUCTIONS
Cathy Lose stays on til Thursday. Dressing stays on til Thursday  No showering til after appointment on Thursday  May sponge bath only  NO bending, lifting or twisting  Antibiotic sent to [facility].... Start tonight  Pain medication sent to [facility] on [address] ... ...  Start when it hurts  Stimulator is ON

## 2023-12-15 ENCOUNTER — TRANSCRIBE ORDERS (OUTPATIENT)
Dept: ADMINISTRATIVE | Age: 88
End: 2023-12-15

## 2023-12-15 DIAGNOSIS — R13.19 OTHER DYSPHAGIA: Primary | ICD-10-CM

## 2023-12-27 ENCOUNTER — HOSPITAL ENCOUNTER (OUTPATIENT)
Dept: GENERAL RADIOLOGY | Age: 88
Discharge: HOME | End: 2023-12-29
Attending: FAMILY MEDICINE
Payer: MEDICARE

## 2023-12-27 DIAGNOSIS — R13.19 OTHER DYSPHAGIA: ICD-10-CM

## 2023-12-27 PROCEDURE — 74230 X-RAY XM SWLNG FUNCJ C+: CPT

## 2023-12-27 PROCEDURE — 92611 MOTION FLUOROSCOPY/SWALLOW: CPT

## 2023-12-27 NOTE — PROCEDURES
INSTRUMENTAL SWALLOW REPORT  MODIFIED BARIUM SWALLOW    NAME: Jana Sanderson   : 1934  MRN: [id number]       Date of Eval: 2023        Radiologist: Dr. Godwin Velasquez    Past Medical History:  has a past medical history of Acquired hypothyroidism, AV block, Bladder cancer ([address]), BPH (benign prostatic hyperplasia), CAD (coronary artery disease), Carotid stenosis, bilateral, Chronic constipation, Chronic fatigue, Diabetes mellitus ([address]), ED (erectile dysfunction), Hyperlipidemia, Hypertension, Insomnia, Kidney stone, MI (myocardial infarction) ([address]), Nonrheumatic aortic (valve) stenosis, Sciatica of left side, Thyroid disease, and VMR (vasomotor rhinitis). Past Surgical History:  has a past surgical history that includes Tonsillectomy; joint replacement (Bilateral); Coronary angioplasty (); Finger trigger release (Bilateral); Cysto with Biopsy (without Fulguration); Colonoscopy; Coronary artery bypass graft (2023); Cardiac catheterization (2023); Spinal Cord Stimulator Surgery (N/A, 10/16/2023); and Pain management procedure (N/A, 2023). Type of Study: Initial MBS    Patient Complaints/Reason for Referral:  Jana Sanderson was referred for a MBS to assess the efficiency of his swallow function, assess for aspiration, and to make recommendations regarding safe dietary consistencies, effective compensatory strategies, and safe eating environment. Patient complaints: C/o coughing/choking on PO when reclined in chair. Behavior/Cognition/Vision/Hearing:  Behavior/Cognition: Alert; Cooperative  Vision: Within Functional Limits  Hearing: Within functional limits    Impressions:  Pt. With no penetration, no aspiration with all consistencies tested. Min vallecula residual noted with all consistencies tested. Premature spill to the vallecula and pyriform with puree and soft solid. Otherwise, oral phase is functional. ST to recommend Regular with thin liquid diet.   Pt. Provided with education

## (undated) DEVICE — APPLICATOR MEDICATED 26 CC SOLUTION HI LT ORNG CHLORAPREP

## (undated) DEVICE — SUTURE MCRYL SZ 3-0 L27IN ABSRB UD PS-2 3/8 CIR REV CUT NDL MCP427H

## (undated) DEVICE — DRAPE,UTILTY,TAPE,15X26, 4EA/PK: Brand: MEDLINE

## (undated) DEVICE — CABLE NEUROSTIM EXTN 1X16 L213CM OR PRECIS SPECTR

## (undated) DEVICE — GLOVE SURG SZ 85 CRM LTX FREE POLYISOPRENE POLYMER BEAD ANTI

## (undated) DEVICE — GARMENT,MEDLINE,DVT,INT,CALF,MED, GEN2: Brand: MEDLINE

## (undated) DEVICE — DRAPE,T,LAPARO,TRANS,STERILE: Brand: MEDLINE

## (undated) DEVICE — 3M™ IOBAN™ 2 ANTIMICROBIAL INCISE DRAPE 6650EZ: Brand: IOBAN™ 2

## (undated) DEVICE — OR CABLE 2X8 61CM AND EXTENSION

## (undated) DEVICE — DRESSING TRNSPAR W8XL12IN FLM SURESITE 123

## (undated) DEVICE — 3M™ TEGADERM™ CHG DRESSING 25/CARTON 4 CARTONS/CASE 1657: Brand: TEGADERM™

## (undated) DEVICE — MINOR BSIN PK

## (undated) DEVICE — SOLUTION PREP PAINT POV IOD FOR SKIN MUCOUS MEM

## (undated) DEVICE — SUTURING DEVICE: Brand: FIXATE ™

## (undated) DEVICE — WIPES SKIN CLOTH READYPREP 9 X 10.5 IN 2% CHLORHEX GLUCONATE CHG PREOP

## (undated) DEVICE — SYRINGE EPI 7ML LUERLOCK RESISTANCE LOSS EPILOR

## (undated) DEVICE — O.R. CABLE 1X16, 61CM AND EXTENSION
Type: IMPLANTABLE DEVICE | Site: BACK | Status: NON-FUNCTIONAL
Removed: 2023-10-16

## (undated) DEVICE — MASTISOL ADHESIVE LIQ 2/3ML

## (undated) DEVICE — C-ARM: Brand: UNBRANDED

## (undated) DEVICE — SOLUTION IRRIG 1000ML 0.9% SOD CHL USP POUR PLAS BTL

## (undated) DEVICE — LIQUIBAND RAPID ADHESIVE 36/CS 0.8ML: Brand: MEDLINE

## (undated) DEVICE — FASTNER PERC CATH AND TUBE 5-12F STAYFIX

## (undated) DEVICE — SPONGE LAP W18XL18IN WHT COT 4 PLY FLD STRUNG RADPQ DISP ST 2 PER PACK

## (undated) DEVICE — SUTURE D SPEC VCRL 2 0 D8876

## (undated) DEVICE — SUTURE VCRL SZ 3-0 L18IN ABSRB UD L26MM SH 1/2 CIR J864D

## (undated) DEVICE — PATIENT TRIAL KIT: Brand: WAVEWRITER ALPHA™

## (undated) DEVICE — SPONGE GZ W4XL4IN RAYON POLY CVR W/NONWOVEN FAB STRL 2/PK

## (undated) DEVICE — BLANKET WRM W40.2XL55.9IN IORT LO BODY + MISTRAL AIR

## (undated) DEVICE — 28CM STRAW TUNNELING TOOL

## (undated) DEVICE — YANKAUER,FLEXIBLE HANDLE,REGLR CAPACITY: Brand: MEDLINE INDUSTRIES, INC.

## (undated) DEVICE — SYRINGE IRRIG 60ML SFT PLIABLE BLB EZ TO GRP 1 HND USE W/